# Patient Record
Sex: FEMALE | Race: BLACK OR AFRICAN AMERICAN | Employment: FULL TIME | URBAN - METROPOLITAN AREA
[De-identification: names, ages, dates, MRNs, and addresses within clinical notes are randomized per-mention and may not be internally consistent; named-entity substitution may affect disease eponyms.]

---

## 2019-09-13 LAB
CHLAMYDIA, EXTERNAL: NEGATIVE
HBSAG, EXTERNAL: NEGATIVE
HIV, EXTERNAL: NONREACTIVE
HIV, EXTERNAL: NORMAL
N. GONORRHEA, EXTERNAL: NEGATIVE
RUBELLA, EXTERNAL: NORMAL
T. PALLIDUM, EXTERNAL: NEGATIVE
T. PALLIDUM, EXTERNAL: NORMAL

## 2019-10-01 NOTE — H&P
Obstetrics Admission History & Physical    Name: Navi Arias MRN: [id number]  SSN: [id number]    YOB: 1985  Age: 28 y.o. Sex: female      Subjective:     Reason for Admission:  Pregnancy and cervical insufficiency    History of Present Illness: Navi Arias is a 28 y.o.  female at [id number] at 13w5d scheduled for Amezquita cerclage for history of 20wk PTL with twins, f/b mueller delivered at term following prophylactic cerclage and godfrey. Patient has had normal  testing thus far. Her history is notable for Rh neg status, anxiety but not on meds, PCOS on metformin, sickle cell trait but neg FOB testing and AMA but with normal NT. She is without complaint.     OB History        2    Para   1    Term   1       0    AB   1    Living   1       SAB   1    TAB   0    Ectopic   0    Molar        Multiple   1    Live Births   2              Past Medical History:   Diagnosis Date    Complication of anesthesia     lidocaine - rash    HX OTHER MEDICAL     polyps in ovarian tubes    HX OTHER MEDICAL     D&C post fetal demise 2014    Infertility, female     metformin, clomid    Leukopenia     hx of low WBC 2-3, idiopathic    Neuropathy     Phlebitis and thrombophlebitis of unspecified site     varicose veins bilateral legs    Polycystic disease, ovaries     Rhesus isoimmunization unspecified as to episode of care in pregnancy     Sickle-cell disease, unspecified     trait -  negative     Past Surgical History:   Procedure Laterality Date    HX DILATION AND CURETTAGE      HX OTHER SURGICAL  2014    d&c (retained placenta post fetal demise)     Social History     Socioeconomic History    Marital status:      Spouse name: Not on file    Number of children: Not on file    Years of education: Not on file    Highest education level: Not on file   Occupational History    Not on file   Social Needs    Financial resource strain: Not on file  Food insecurity:     Worry: Not on file     Inability: Not on file    Transportation needs:     Medical: Not on file     Non-medical: Not on file   Tobacco Use    Smoking status: Never Smoker    Smokeless tobacco: Never Used   Substance and Sexual Activity    Alcohol use: No    Drug use: No    Sexual activity: Yes     Partners: Male     Birth control/protection: None   Lifestyle    Physical activity:     Days per week: Not on file     Minutes per session: Not on file    Stress: Not on file   Relationships    Social connections:     Talks on phone: Not on file     Gets together: Not on file     Attends Faith service: Not on file     Active member of club or organization: Not on file     Attends meetings of clubs or organizations: Not on file     Relationship status: Not on file    Intimate partner violence:     Fear of current or ex partner: Not on file     Emotionally abused: Not on file     Physically abused: Not on file     Forced sexual activity: Not on file   Other Topics Concern    Not on file   Social History Narrative    Not on file     Family History   Problem Relation Age of Onset    Sickle Cell Anemia Mother     Hypertension Mother     Breast Cancer Paternal Grandmother     Cancer Paternal Grandmother     Stroke Maternal Grandfather      Allergies   Allergen Reactions    Betadine [Povidone-Iodine] Rash    Flagyl [Metronidazole] Swelling     Tongue swelling      Lidocaine Rash    Zantac [Ranitidine Hcl] Vertigo and Drowsiness     Prior to Admission medications    Medication Sig Start Date End Date Taking? Authorizing Provider   multivitamin (ONE A DAY) tablet Take 1 Tab by mouth daily. Provider, Historical   cholecalciferol (VITAMIN D3) 400 unit tab tablet Take 400 Units by mouth daily. Provider, Historical   ibuprofen (MOTRIN) 600 mg tablet Take 1 Tab by mouth every six (6) hours as needed for Pain.  4/11/15   Huang Mcdonald MD        Review of Systems:  A comprehensive review of systems was negative except for that written in the History of Present Illness. Objective:     Vitals: There were no vitals taken for this visit. No data recorded. No data recorded     Physical Exam:  Patient without distress. Heart: Regular rate and rhythm  Lung: normal respiratory effort  Abdomen: soft, nontender  Lower Extremities:  - Edema No       Labs: No results found for this or any previous visit (from the past 24 hour(s)). Assessment and Plan:     [id number] at 13w5d presents for scheduled prophylactic Amezquita cerclage.     - consent obtained and all questions answered  - to OR for Amezquita cerclage  - will need rhogam post-procedure    Signed By:  Lizette Coyne MD     October 1, 2019

## 2019-10-02 ENCOUNTER — ANESTHESIA EVENT (OUTPATIENT)
Dept: LABOR AND DELIVERY | Age: 34
End: 2019-10-02
Payer: COMMERCIAL

## 2019-10-02 ENCOUNTER — HOSPITAL ENCOUNTER (OUTPATIENT)
Age: 34
Discharge: HOME | End: 2019-10-02
Attending: OBSTETRICS & GYNECOLOGY | Admitting: OBSTETRICS & GYNECOLOGY
Payer: COMMERCIAL

## 2019-10-02 ENCOUNTER — ANESTHESIA (OUTPATIENT)
Dept: LABOR AND DELIVERY | Age: 34
End: 2019-10-02
Payer: COMMERCIAL

## 2019-10-02 VITALS
OXYGEN SATURATION: 99 % | WEIGHT: 118 LBS | SYSTOLIC BLOOD PRESSURE: 108 MMHG | RESPIRATION RATE: 18 BRPM | DIASTOLIC BLOOD PRESSURE: 66 MMHG | HEART RATE: 88 BPM | HEIGHT: 65 IN | BODY MASS INDEX: 19.66 KG/M2 | TEMPERATURE: 98.2 F

## 2019-10-02 DIAGNOSIS — G89.18 POST-OP PAIN: Primary | ICD-10-CM

## 2019-10-02 LAB
ERYTHROCYTE [DISTWIDTH] IN BLOOD BY AUTOMATED COUNT: 13.2 % (ref 11.5–14.5)
HCT VFR BLD AUTO: 34.5 % (ref 35–47)
HGB BLD-MCNC: 11.2 G/DL (ref 11.5–16)
MCH RBC QN AUTO: 27.8 PG (ref 26–34)
MCHC RBC AUTO-ENTMCNC: 32.5 G/DL (ref 30–36.5)
MCV RBC AUTO: 85.6 FL (ref 80–99)
NRBC # BLD: 0 K/UL (ref 0–0.01)
NRBC BLD-RTO: 0 PER 100 WBC
PLATELET # BLD AUTO: 284 K/UL (ref 150–400)
PMV BLD AUTO: 8.9 FL (ref 8.9–12.9)
RBC # BLD AUTO: 4.03 M/UL (ref 3.8–5.2)
WBC # BLD AUTO: 4.9 K/UL (ref 3.6–11)

## 2019-10-02 PROCEDURE — 74011250636 HC RX REV CODE- 250/636: Performed by: OBSTETRICS & GYNECOLOGY

## 2019-10-02 PROCEDURE — 75410000003 HC RECOV DEL/VAG/CSECN EA 0.5 HR: Performed by: OBSTETRICS & GYNECOLOGY

## 2019-10-02 PROCEDURE — 74011000258 HC RX REV CODE- 258: Performed by: NURSE ANESTHETIST, CERTIFIED REGISTERED

## 2019-10-02 PROCEDURE — 96360 HYDRATION IV INFUSION INIT: CPT

## 2019-10-02 PROCEDURE — 96367 TX/PROPH/DG ADDL SEQ IV INF: CPT

## 2019-10-02 PROCEDURE — 96372 THER/PROPH/DIAG INJ SC/IM: CPT

## 2019-10-02 PROCEDURE — 77030007866 HC KT SPN ANES BBMI -B: Performed by: ANESTHESIOLOGY

## 2019-10-02 PROCEDURE — 99283 EMERGENCY DEPT VISIT LOW MDM: CPT

## 2019-10-02 PROCEDURE — 76060000078 HC EPIDURAL ANESTHESIA: Performed by: OBSTETRICS & GYNECOLOGY

## 2019-10-02 PROCEDURE — 74011000250 HC RX REV CODE- 250: Performed by: OBSTETRICS & GYNECOLOGY

## 2019-10-02 PROCEDURE — 77030002986 HC SUT PROL J&J -A

## 2019-10-02 PROCEDURE — 76010000389 HC LDRP PROCEDURE 0.5 TO 1 HR: Performed by: OBSTETRICS & GYNECOLOGY

## 2019-10-02 PROCEDURE — 74011000250 HC RX REV CODE- 250: Performed by: NURSE ANESTHETIST, CERTIFIED REGISTERED

## 2019-10-02 PROCEDURE — 85027 COMPLETE CBC AUTOMATED: CPT

## 2019-10-02 PROCEDURE — 74011250636 HC RX REV CODE- 250/636: Performed by: NURSE ANESTHETIST, CERTIFIED REGISTERED

## 2019-10-02 PROCEDURE — 36415 COLL VENOUS BLD VENIPUNCTURE: CPT

## 2019-10-02 PROCEDURE — 77030018846 HC SOL IRR STRL H20 ICUM -A

## 2019-10-02 RX ORDER — BUPIVACAINE HYDROCHLORIDE 7.5 MG/ML
INJECTION, SOLUTION EPIDURAL; RETROBULBAR AS NEEDED
Status: DISCONTINUED | OUTPATIENT
Start: 2019-10-02 | End: 2019-10-02 | Stop reason: HOSPADM

## 2019-10-02 RX ORDER — OXYCODONE HYDROCHLORIDE 5 MG/1
5 TABLET ORAL
Qty: 12 TAB | Refills: 0 | Status: SHIPPED | OUTPATIENT
Start: 2019-10-02 | End: 2019-10-05

## 2019-10-02 RX ORDER — FENTANYL CITRATE 50 UG/ML
INJECTION, SOLUTION INTRAMUSCULAR; INTRAVENOUS AS NEEDED
Status: DISCONTINUED | OUTPATIENT
Start: 2019-10-02 | End: 2019-10-02 | Stop reason: HOSPADM

## 2019-10-02 RX ORDER — METFORMIN HYDROCHLORIDE 500 MG/1
500 TABLET ORAL 2 TIMES DAILY WITH MEALS
COMMUNITY

## 2019-10-02 RX ADMIN — HUMAN RHO(D) IMMUNE GLOBULIN 0.3 MG: 300 INJECTION, SOLUTION INTRAMUSCULAR at 12:38

## 2019-10-02 RX ADMIN — SODIUM CHLORIDE 1 G: 900 INJECTION INTRAVENOUS at 11:36

## 2019-10-02 RX ADMIN — SODIUM CHLORIDE 50000 UNITS: 900 IRRIGANT IRRIGATION at 11:30

## 2019-10-02 RX ADMIN — BUPIVACAINE HYDROCHLORIDE 7.5 MG: 7.5 INJECTION, SOLUTION EPIDURAL; RETROBULBAR at 11:18

## 2019-10-02 RX ADMIN — FENTANYL CITRATE 25 MCG: 50 INJECTION, SOLUTION INTRAMUSCULAR; INTRAVENOUS at 11:18

## 2019-10-02 NOTE — DISCHARGE INSTRUCTIONS
Patient Education        Cervical Cerclage to Prevent  Delivery: What to Expect at Home  Your Recovery  Cervical cerclage (say \"SER-vuh-kul ser-KLAZH\") is a procedure that helps keep your cervix from opening too soon. Your doctor has sewn your cervix shut to help prevent  labor. For the next few days, you may have:  · Cramping. · Spotting. · Pain when you urinate. Your doctor may give you instructions on when you can do your normal activities again, such as driving and going back to work. Your doctor will remove the stitches around your cervix at 37 weeks, or if you go into  labor or show signs of infection. This care sheet gives you a general idea about how long it will take for you to recover. But each person recovers at a different pace. Follow the steps below to get better as quickly as possible. How can you care for yourself at home? Activity    · Rest when you feel tired.     · Ask your doctor when it is okay for you to have sex. Medicines    · Be safe with medicines. Read and follow all instructions on the label.     · If you are not taking a prescription pain medicine, ask your doctor if you can take an over-the-counter medicine.     · Your doctor will tell you if and when you can restart your medicines. He or she will also give you instructions about taking any new medicines. Follow-up care is a key part of your treatment and safety. Be sure to make and go to all appointments, and call your doctor if you are having problems. It's also a good idea to know your test results and keep a list of the medicines you take. When should you call for help? Call 911 anytime you think you may need emergency care.  For example, call if:    · You have severe trouble breathing.     · You have sudden, severe pain in your belly.     · You have severe vaginal bleeding.    Call your doctor now or seek immediate medical care if:    · You have new pelvic pain, or the pain in your pelvis gets worse.     · You have a new discharge from your vagina.     · You have a new or higher fever.    Watch closely for changes in your health, and be sure to contact your doctor if you have any problems. Where can you learn more? Go to http://luz maria-corazon.info/. Enter M484 in the search box to learn more about \"Cervical Cerclage to Prevent  Delivery: What to Expect at Home. \"  Current as of: May 29, 2019  Content Version: 12.2  © 6276-1645 [employer]. Care instructions adapted under license by [employer] (which disclaims liability or warranty for this information). If you have questions about a medical condition or this instruction, always ask your healthcare professional. [address] any warranty or liability for your use of this information.

## 2019-10-02 NOTE — ANESTHESIA PROCEDURE NOTES
Spinal Block    Start time: 10/2/2019 11:10 AM  End time: 10/2/2019 11:14 AM  Performed by: Dana García MD  Authorized by: Dana García MD     Pre-procedure:   Indications: primary anesthetic    Timeout Time: 11:10          Spinal Block:   Patient Position:  Seated  Prep Region:  Lumbar  Prep: chlorhexidine      Location:  L3-4  Technique:  Single shot        Needle:   Needle Type:  Pencan  Needle Gauge:  24 G  Attempts:  1      Events: CSF confirmed, no blood with aspiration and no paresthesia        Assessment:  Insertion:  Uncomplicated  Patient tolerance:  Patient tolerated the procedure well with no immediate complications

## 2019-10-02 NOTE — DISCHARGE SUMMARY
Gynecology Discharge Summary     Patient ID:  Wero White  [id number]  28 y.o.  1985    Admit date: 10/2/2019    Discharge date: 10/2/2019     Admission Diagnoses:   Patient Active Problem List   Diagnosis Code    Active labor [id number]    Normal vaginal delivery O80       Discharge Diagnoses: There are no discharge diagnoses documented for the most recent discharge. Patient Active Problem List   Diagnosis Code    Active labor [id number]    Normal vaginal delivery O80       Procedures for this admission: Procedure(s):  [facility] Course:    Disposition: Home or self care    Discharged Condition: stable            Patient Instructions:   Current Discharge Medication List      START taking these medications    Details   oxyCODONE IR (ROXICODONE) 5 mg immediate release tablet Take 1 Tab by mouth every six (6) hours as needed for Pain for up to 3 days. Max Daily Amount: 20 mg.  Qty: 12 Tab, Refills: 0    Associated Diagnoses: Post-op pain         CONTINUE these medications which have NOT CHANGED    Details   metFORMIN (GLUCOPHAGE) 500 mg tablet Take 500 mg by mouth two (2) times daily (with meals). [id number] arlene-folic-dha (PRENATAL DHA+COMPLETE PRENATAL) -300 mg-mcg-mg cmpk Take  by mouth. cholecalciferol (VITAMIN D3) 400 unit tab tablet Take 400 Units by mouth daily. STOP taking these medications       multivitamin (ONE A DAY) tablet Comments:   Reason for Stopping:         ibuprofen (MOTRIN) 600 mg tablet Comments:   Reason for Stopping:             Activity: Activity as tolerated and no driving for today and No sex for 2 weeks  Diet: Regular Diet  Wound Care: Keep wound clean and dry and None needed    Follow-up with Dr. Ben Diamond and Yumiko Peter in 1 week.     Signed:  Sarah Pandey MD  10/2/2019  11:58 AM

## 2019-10-02 NOTE — OP NOTES
OPERATIVE REPORT    PREOPERATIVE DIAGNOSIS: Cervical insufficiency. POSTOPERATIVE DIAGNOSIS: Cervical insufficiency. PROCEDURE: prophylactic Amezquita cervical cerclage. SURGEON: Melani Pyle. Harriet Urbina MD    ASSISTANT: Melissa Cortes MD    ANESTHESIA: Spinal.    COMPLICATIONS: None. ESTIMATED BLOOD LOSS: 50 mL. URINE OUTPUT: void prior to procedure    SPECIMENS: None. INDICATIONS: history of cervical insufficiency    FINDINGS: soft cervix, 1cm dilated external os, internal os closed    DESCRIPTION OF PROCEDURE: The patient was taken back to the operating room. She was given anesthesia via placement of a spinal. She was laid supine on  the operating room table with her legs in lithotomy position. She was  prepped and draped in standard sterile fashion. An exam under anesthesia  revealed the findings above. An operative speculum was then inserted into  the vagina, dilute bacitracin was used to irrigate the vagina. The cervix was visualized and grasped anteriorly with a ring forceps. A #1 prolene suture was used to circumferentially create a  pursestring surrounding the cervix at the cervicovaginal junction. This  suture was then tied at the 12 o'clock position with air knots for  later identification. Hemostasis was excellent. The speculum was removed. An exam under anesthesia again revealed good cerclage placement and tightly closed cervix. The patient was taken to the recovery room in stable condition, having tolerated the procedure well. Melani Pyle.  Harriet Urbina MD

## 2019-10-02 NOTE — ANESTHESIA PREPROCEDURE EVALUATION
Relevant Problems   No relevant active problems       Anesthetic History   No history of anesthetic complications            Review of Systems / Medical History  Patient summary reviewed, nursing notes reviewed and pertinent labs reviewed    Pulmonary  Within defined limits                 Neuro/Psych   Within defined limits           Cardiovascular  Within defined limits                     GI/Hepatic/Renal  Within defined limits              Endo/Other  Within defined limits           Other Findings              Physical Exam    Airway  Mallampati: II  TM Distance: > 6 cm  Neck ROM: normal range of motion   Mouth opening: Normal     Cardiovascular  Regular rate and rhythm,  S1 and S2 normal,  no murmur, click, rub, or gallop             Dental  No notable dental hx       Pulmonary  Breath sounds clear to auscultation               Abdominal  GI exam deferred       Other Findings            Anesthetic Plan    ASA: 2  Anesthesia type: spinal          Induction: Intravenous  Anesthetic plan and risks discussed with: Patient

## 2019-10-02 NOTE — ANESTHESIA POSTPROCEDURE EVALUATION
Post-Anesthesia Evaluation and Assessment    Patient: Gianna Roberts MRN: [id number]  SSN: [id number]    YOB: 1985  Age: 28 y.o. Sex: female      I have evaluated the patient and they are stable and ready for discharge from the PACU. Cardiovascular Function/Vital Signs  Visit Vitals  /71   Pulse 75   Temp 36.6 °C (97.9 °F)   Resp 18   Ht 5' 5\" (1.651 m)   Wt 53.5 kg (118 lb)   SpO2 94%   BMI 19.64 kg/m²       Patient is status post Spinal anesthesia for Procedure(s) with comments:  CERCLAGE - EDC 4/3/20. Nausea/Vomiting: None    Postoperative hydration reviewed and adequate. Pain:  Pain Scale 1: Numeric (0 - 10) (10/02/19 0936)  Pain Intensity 1: 0 (10/02/19 0936)   Managed    Neurological Status: At baseline    Mental Status, Level of Consciousness: Alert and  oriented to person, place, and time    Pulmonary Status:   O2 Device: Room air (10/02/19 1203)   Adequate oxygenation and airway patent    Complications related to anesthesia: None    Post-anesthesia assessment completed. No concerns    Signed By: Anum Parisi MD     October 2, 2019              Procedure(s):  CERCLAGE.    spinal    <BSHSIANPOST>    Vitals Value Taken Time   /63 10/2/2019 12:26 PM   Temp 36.6 °C (97.9 °F) 10/2/2019 12:03 PM   Pulse 89 10/2/2019 12:27 PM   Resp 18 10/2/2019 12:03 PM   SpO2 87 % 10/2/2019 12:28 PM   Vitals shown include unvalidated device data.

## 2019-10-02 NOTE — BRIEF OP NOTE
BRIEF OPERATIVE NOTE    Date of Procedure: 10/2/2019   Preoperative Diagnosis: cervical insufficiency  Postoperative Diagnosis: same  Procedure(s):  Prophylactic Amezquita CERCLAGE  Surgeon(s) and Role:     * Cassi Morgan, Yessica Castillo MD - Primary     * Lo Reed MD - Assisting         Surgical Staff:  Circ-1: Galilea Shaw RN  Scrub Tech-1: Lesa Black  [facility] Staff: Jessica Weiner RN  No case tracking events are documented in the log.   Anesthesia: Spinal   Estimated Blood Loss: 50ml  Specimens: * No specimens in log *   Findings: soft but closed cervix   Complications: none  Implants: * No implants in log *

## 2019-10-02 NOTE — ROUTINE PROCESS
8049: patient arrived to [facility] 1277 for scheduled cerclage. Patient denies any leaking of fluid or bleeding; reports some fetal movement. 1110: to OR2 for cerclage. 1155: back to LD6 for recovery.

## 2019-10-03 LAB
BLD PROD TYP BPU: NORMAL
BPU ID: NORMAL
GA (WEEKS): NORMAL WK
STATUS OF UNIT,%ST: NORMAL
UNIT DIVISION, %UDIV: 0

## 2020-03-06 LAB — GRBS, EXTERNAL: NEGATIVE

## 2020-03-29 ENCOUNTER — HOSPITAL ENCOUNTER (INPATIENT)
Age: 35
LOS: 1 days | Discharge: HOME | DRG: 560 | End: 2020-03-30
Attending: OBSTETRICS & GYNECOLOGY | Admitting: ADVANCED PRACTICE MIDWIFE
Payer: MEDICAID

## 2020-03-29 PROBLEM — Z34.90 PREGNANCY: Status: ACTIVE | Noted: 2020-03-29

## 2020-03-29 LAB
BASOPHILS # BLD: 0 K/UL (ref 0–0.1)
BASOPHILS # BLD: 0 K/UL (ref 0–0.1)
BASOPHILS NFR BLD: 0 % (ref 0–1)
BASOPHILS NFR BLD: 0 % (ref 0–1)
DIFFERENTIAL METHOD BLD: ABNORMAL
DIFFERENTIAL METHOD BLD: ABNORMAL
EOSINOPHIL # BLD: 0 K/UL (ref 0–0.4)
EOSINOPHIL # BLD: 0 K/UL (ref 0–0.4)
EOSINOPHIL NFR BLD: 0 % (ref 0–7)
EOSINOPHIL NFR BLD: 0 % (ref 0–7)
ERYTHROCYTE [DISTWIDTH] IN BLOOD BY AUTOMATED COUNT: 12.9 % (ref 11.5–14.5)
ERYTHROCYTE [DISTWIDTH] IN BLOOD BY AUTOMATED COUNT: 13 % (ref 11.5–14.5)
HCT VFR BLD AUTO: 30.4 % (ref 35–47)
HCT VFR BLD AUTO: 35.3 % (ref 35–47)
HGB BLD-MCNC: 11.4 G/DL (ref 11.5–16)
HGB BLD-MCNC: 9.9 G/DL (ref 11.5–16)
IMM GRANULOCYTES # BLD AUTO: 0.1 K/UL (ref 0–0.04)
IMM GRANULOCYTES # BLD AUTO: 0.1 K/UL (ref 0–0.04)
IMM GRANULOCYTES NFR BLD AUTO: 0 % (ref 0–0.5)
IMM GRANULOCYTES NFR BLD AUTO: 1 % (ref 0–0.5)
LYMPHOCYTES # BLD: 0.9 K/UL (ref 0.8–3.5)
LYMPHOCYTES # BLD: 1.1 K/UL (ref 0.8–3.5)
LYMPHOCYTES NFR BLD: 16 % (ref 12–49)
LYMPHOCYTES NFR BLD: 8 % (ref 12–49)
MCH RBC QN AUTO: 28.5 PG (ref 26–34)
MCH RBC QN AUTO: 28.6 PG (ref 26–34)
MCHC RBC AUTO-ENTMCNC: 32.3 G/DL (ref 30–36.5)
MCHC RBC AUTO-ENTMCNC: 32.6 G/DL (ref 30–36.5)
MCV RBC AUTO: 87.9 FL (ref 80–99)
MCV RBC AUTO: 88.3 FL (ref 80–99)
MONOCYTES # BLD: 0.5 K/UL (ref 0–1)
MONOCYTES # BLD: 0.8 K/UL (ref 0–1)
MONOCYTES NFR BLD: 6 % (ref 5–13)
MONOCYTES NFR BLD: 7 % (ref 5–13)
NEUTS SEG # BLD: 5.5 K/UL (ref 1.8–8)
NEUTS SEG # BLD: 9.6 K/UL (ref 1.8–8)
NEUTS SEG NFR BLD: 77 % (ref 32–75)
NEUTS SEG NFR BLD: 85 % (ref 32–75)
NRBC # BLD: 0 K/UL (ref 0–0.01)
NRBC # BLD: 0 K/UL (ref 0–0.01)
NRBC BLD-RTO: 0 PER 100 WBC
NRBC BLD-RTO: 0 PER 100 WBC
PLATELET # BLD AUTO: 206 K/UL (ref 150–400)
PLATELET # BLD AUTO: 228 K/UL (ref 150–400)
PMV BLD AUTO: 9.6 FL (ref 8.9–12.9)
PMV BLD AUTO: 9.7 FL (ref 8.9–12.9)
RBC # BLD AUTO: 3.46 M/UL (ref 3.8–5.2)
RBC # BLD AUTO: 4 M/UL (ref 3.8–5.2)
WBC # BLD AUTO: 11.4 K/UL (ref 3.6–11)
WBC # BLD AUTO: 7.1 K/UL (ref 3.6–11)

## 2020-03-29 PROCEDURE — 86900 BLOOD TYPING SEROLOGIC ABO: CPT

## 2020-03-29 PROCEDURE — 85025 COMPLETE CBC W/AUTO DIFF WBC: CPT

## 2020-03-29 PROCEDURE — 74011250637 HC RX REV CODE- 250/637

## 2020-03-29 PROCEDURE — 65410000002 HC RM PRIVATE OB

## 2020-03-29 PROCEDURE — 85461 HEMOGLOBIN FETAL: CPT

## 2020-03-29 PROCEDURE — 75410000003 HC RECOV DEL/VAG/CSECN EA 0.5 HR

## 2020-03-29 PROCEDURE — 99282 EMERGENCY DEPT VISIT SF MDM: CPT

## 2020-03-29 PROCEDURE — 77030012890

## 2020-03-29 PROCEDURE — 75410000000 HC DELIVERY VAGINAL/SINGLE

## 2020-03-29 PROCEDURE — 0KQM0ZZ REPAIR PERINEUM MUSCLE, OPEN APPROACH: ICD-10-PCS | Performed by: OBSTETRICS & GYNECOLOGY

## 2020-03-29 PROCEDURE — 75410000002 HC LABOR FEE PER 1 HR

## 2020-03-29 PROCEDURE — 74011250636 HC RX REV CODE- 250/636

## 2020-03-29 PROCEDURE — 36415 COLL VENOUS BLD VENIPUNCTURE: CPT

## 2020-03-29 RX ORDER — SIMETHICONE 80 MG
80 TABLET,CHEWABLE ORAL
Status: DISCONTINUED | OUTPATIENT
Start: 2020-03-29 | End: 2020-03-30 | Stop reason: HOSPADM

## 2020-03-29 RX ORDER — NALOXONE HYDROCHLORIDE 0.4 MG/ML
0.4 INJECTION, SOLUTION INTRAMUSCULAR; INTRAVENOUS; SUBCUTANEOUS AS NEEDED
Status: DISCONTINUED | OUTPATIENT
Start: 2020-03-29 | End: 2020-03-30 | Stop reason: HOSPADM

## 2020-03-29 RX ORDER — SODIUM CHLORIDE 0.9 % (FLUSH) 0.9 %
5-40 SYRINGE (ML) INJECTION EVERY 8 HOURS
Status: DISCONTINUED | OUTPATIENT
Start: 2020-03-29 | End: 2020-03-29

## 2020-03-29 RX ORDER — HYDROCORTISONE ACETATE PRAMOXINE HCL 2.5; 1 G/100G; G/100G
CREAM TOPICAL AS NEEDED
Status: DISCONTINUED | OUTPATIENT
Start: 2020-03-29 | End: 2020-03-30 | Stop reason: HOSPADM

## 2020-03-29 RX ORDER — IBUPROFEN 600 MG/1
600 TABLET ORAL
Qty: 30 TAB | Refills: 1 | Status: SHIPPED | OUTPATIENT
Start: 2020-03-29

## 2020-03-29 RX ORDER — HYDROCODONE BITARTRATE AND ACETAMINOPHEN 5; 325 MG/1; MG/1
1 TABLET ORAL
Status: DISCONTINUED | OUTPATIENT
Start: 2020-03-29 | End: 2020-03-30 | Stop reason: HOSPADM

## 2020-03-29 RX ORDER — ACETAMINOPHEN 325 MG/1
650 TABLET ORAL
Status: DISCONTINUED | OUTPATIENT
Start: 2020-03-29 | End: 2020-03-30 | Stop reason: HOSPADM

## 2020-03-29 RX ORDER — MAG HYDROX/ALUMINUM HYD/SIMETH 200-200-20
30 SUSPENSION, ORAL (FINAL DOSE FORM) ORAL
Status: DISCONTINUED | OUTPATIENT
Start: 2020-03-29 | End: 2020-03-29

## 2020-03-29 RX ORDER — SODIUM CHLORIDE 0.9 % (FLUSH) 0.9 %
5-40 SYRINGE (ML) INJECTION AS NEEDED
Status: DISCONTINUED | OUTPATIENT
Start: 2020-03-29 | End: 2020-03-29

## 2020-03-29 RX ORDER — NALOXONE HYDROCHLORIDE 0.4 MG/ML
0.4 INJECTION, SOLUTION INTRAMUSCULAR; INTRAVENOUS; SUBCUTANEOUS AS NEEDED
Status: DISCONTINUED | OUTPATIENT
Start: 2020-03-29 | End: 2020-03-29

## 2020-03-29 RX ORDER — MISOPROSTOL 200 UG/1
1000 TABLET ORAL
Status: COMPLETED | OUTPATIENT
Start: 2020-03-29 | End: 2020-03-29

## 2020-03-29 RX ORDER — MISOPROSTOL 200 UG/1
TABLET ORAL
Status: COMPLETED
Start: 2020-03-29 | End: 2020-03-29

## 2020-03-29 RX ORDER — CYANOCOBALAMIN (VITAMIN B-12) 500 MCG
400 TABLET ORAL DAILY
Status: DISCONTINUED | OUTPATIENT
Start: 2020-03-29 | End: 2020-03-30 | Stop reason: HOSPADM

## 2020-03-29 RX ORDER — OXYTOCIN 10 [USP'U]/ML
INJECTION, SOLUTION INTRAMUSCULAR; INTRAVENOUS
Status: COMPLETED
Start: 2020-03-29 | End: 2020-03-29

## 2020-03-29 RX ORDER — OXYTOCIN 10 [USP'U]/ML
10 INJECTION, SOLUTION INTRAMUSCULAR; INTRAVENOUS
Status: COMPLETED | OUTPATIENT
Start: 2020-03-29 | End: 2020-03-29

## 2020-03-29 RX ORDER — IBUPROFEN 400 MG/1
800 TABLET ORAL EVERY 8 HOURS
Status: DISCONTINUED | OUTPATIENT
Start: 2020-03-29 | End: 2020-03-30 | Stop reason: HOSPADM

## 2020-03-29 RX ORDER — SWAB
1 SWAB, NON-MEDICATED MISCELLANEOUS DAILY
Status: DISCONTINUED | OUTPATIENT
Start: 2020-03-29 | End: 2020-03-30 | Stop reason: HOSPADM

## 2020-03-29 RX ADMIN — OXYTOCIN 10 UNITS: 10 INJECTION, SOLUTION INTRAMUSCULAR; INTRAVENOUS at 02:58

## 2020-03-29 RX ADMIN — MISOPROSTOL 1000 MCG: 200 TABLET ORAL at 03:04

## 2020-03-29 RX ADMIN — OXYTOCIN 10 UNITS: 10 INJECTION INTRAVENOUS at 02:58

## 2020-03-29 NOTE — PROGRESS NOTES
Post-Partum Day Number 0 Progress Note    Acacia Arriaga     Assessment: Doing well, post partum day 1  PPH QBL 1140 --> s/p pit, cytotec. Hb 11.4 --> 9.9    Plan:  1. Continue routine postpartum and perineal care as well as maternal education. 2. Hb stable, pt asymptomatic. Reviewed plan for DC home on . 3. Anticipate discharge home in AM if stable. Information for the patient's :  Sergio Addison [[id number]]   Vaginal, Spontaneous   Patient doing well without significant complaint. Voiding without difficulty, normal lochia. Vitals:  Visit Vitals  BP 96/64   Pulse 96   Temp 98.8 °F (37.1 °C)   Resp 16   Ht 5' 5\" (1.651 m)   Wt 66.2 kg (146 lb)   Breastfeeding Unknown   BMI 24.30 kg/m²     Temp (24hrs), Av.6 °F (37 °C), Min:98 °F (36.7 °C), Max:99.2 °F (37.3 °C)        Exam:   Patient without distress. Abdomen soft, fundus firm, nontender                Perineum with normal lochia noted. Lower extremities are negative for swelling, cords or tenderness.     Labs:     Lab Results   Component Value Date/Time    WBC 11.4 (H) 2020 07:22 AM    WBC 7.1 2020 01:56 AM    WBC 4.9 10/02/2019 10:01 AM    WBC 2.2 (L) 2016 09:13 AM    WBC 3.8 10/16/2016 04:55 AM    WBC 3.0 (L) 2016 08:46 PM    WBC 6.8 2015 06:33 AM    WBC 5.3 10/09/2014 08:58 AM    HGB 9.9 (L) 2020 07:22 AM    HGB 11.4 (L) 2020 01:56 AM    HGB 11.2 (L) 10/02/2019 10:01 AM    HGB 14.0 2016 09:13 AM    HGB 13.2 10/16/2016 04:55 AM    HGB 13.4 2016 08:46 PM    HGB 12.2 2015 06:33 AM    HGB 11.1 (L) 10/09/2014 08:58 AM    HCT 30.4 (L) 2020 07:22 AM    HCT 35.3 2020 01:56 AM    HCT 34.5 (L) 10/02/2019 10:01 AM    HCT 41.5 2016 09:13 AM    HCT 38.8 10/16/2016 04:55 AM    HCT 39.9 2016 08:46 PM    HCT 37.1 2015 06:33 AM    HCT 32.9 (L) 10/09/2014 08:58 AM    PLATELET 610  07:22 AM    PLATELET 901  01:56 AM    PLATELET 419 86/47/7110 10:01 AM    PLATELET 933 85/57/1805 09:13 AM    PLATELET 575 84/66/3858 04:55 AM    PLATELET 99 (L) 66/86/2185 08:46 PM    PLATELET 629 62/20/1184 06:33 AM    PLATELET 666 07/36/9701 08:58 AM       Recent Results (from the past 24 hour(s))   CBC WITH AUTOMATED DIFF    Collection Time: 03/29/20  1:56 AM   Result Value Ref Range    WBC 7.1 3.6 - 11.0 K/uL    RBC 4.00 3.80 - 5.20 M/uL    HGB 11.4 (L) 11.5 - 16.0 g/dL    HCT 35.3 35.0 - 47.0 %    MCV 88.3 80.0 - 99.0 FL    MCH 28.5 26.0 - 34.0 PG    MCHC 32.3 30.0 - 36.5 g/dL    RDW 13.0 11.5 - 14.5 %    PLATELET 178 219 - 262 K/uL    MPV 9.6 8.9 - 12.9 FL    NRBC 0.0 0  WBC    ABSOLUTE NRBC 0.00 0.00 - 0.01 K/uL    NEUTROPHILS 77 (H) 32 - 75 %    LYMPHOCYTES 16 12 - 49 %    MONOCYTES 6 5 - 13 %    EOSINOPHILS 0 0 - 7 %    BASOPHILS 0 0 - 1 %    IMMATURE GRANULOCYTES 1 (H) 0.0 - 0.5 %    ABS. NEUTROPHILS 5.5 1.8 - 8.0 K/UL    ABS. LYMPHOCYTES 1.1 0.8 - 3.5 K/UL    ABS. MONOCYTES 0.5 0.0 - 1.0 K/UL    ABS. EOSINOPHILS 0.0 0.0 - 0.4 K/UL    ABS. BASOPHILS 0.0 0.0 - 0.1 K/UL    ABS. IMM. GRANS. 0.1 (H) 0.00 - 0.04 K/UL    DF AUTOMATED     CBC WITH AUTOMATED DIFF    Collection Time: 03/29/20  7:22 AM   Result Value Ref Range    WBC 11.4 (H) 3.6 - 11.0 K/uL    RBC 3.46 (L) 3.80 - 5.20 M/uL    HGB 9.9 (L) 11.5 - 16.0 g/dL    HCT 30.4 (L) 35.0 - 47.0 %    MCV 87.9 80.0 - 99.0 FL    MCH 28.6 26.0 - 34.0 PG    MCHC 32.6 30.0 - 36.5 g/dL    RDW 12.9 11.5 - 14.5 %    PLATELET 527 669 - 321 K/uL    MPV 9.7 8.9 - 12.9 FL    NRBC 0.0 0  WBC    ABSOLUTE NRBC 0.00 0.00 - 0.01 K/uL    NEUTROPHILS 85 (H) 32 - 75 %    LYMPHOCYTES 8 (L) 12 - 49 %    MONOCYTES 7 5 - 13 %    EOSINOPHILS 0 0 - 7 %    BASOPHILS 0 0 - 1 %    IMMATURE GRANULOCYTES 0 0.0 - 0.5 %    ABS. NEUTROPHILS 9.6 (H) 1.8 - 8.0 K/UL    ABS. LYMPHOCYTES 0.9 0.8 - 3.5 K/UL    ABS. MONOCYTES 0.8 0.0 - 1.0 K/UL    ABS. EOSINOPHILS 0.0 0.0 - 0.4 K/UL    ABS.  BASOPHILS 0.0 0.0 - 0.1 K/UL ABS. IMM.  GRANS. 0.1 (H) 0.00 - 0.04 K/UL    DF AUTOMATED

## 2020-03-29 NOTE — PROGRESS NOTES
TRANSFER - IN REPORT:    Verbal report received from LAUREN(name) on Sidra Whalen  being received from L&D(unit) for routine progression of care      Report consisted of patients Situation, Background, Assessment and   Recommendations(SBAR). Information from the following report(s) SBAR, Kardex, Intake/Output, MAR and Recent Results was reviewed with the receiving nurse. Opportunity for questions and clarification was provided. Assessment completed upon patients arrival to unit and care assumed.

## 2020-03-29 NOTE — DISCHARGE SUMMARY
Obstetrical Discharge Summary     Name: Flor Barron MRN: [id number]  SSN: [id number]    YOB: 1985  Age: 27 y.o. Sex: female      Admit Date: 3/29/2020    Discharge Date: 3/30/20    Admitting Physician: Rowena Andrew CNM     Attending Physician:  Yessica Cardenas,*     Admission Diagnoses: Pregnancy [Z34.90]  Pregnancy [Z34.90]    Discharge Diagnoses:   Information for the patient's :  Nanci Griffiths [[id number]]   Delivery of a 3.425 kg female infant via Vaginal, Spontaneous on 3/29/2020 at 2:45 AM  by Rowena Andrew. Apgars were 9  and 9 . Additional Diagnoses:   Hospital Problems  Date Reviewed: 2016          Codes Class Noted POA    Pregnancy ICD-10-CM: Z34.90  ICD-9-CM: V22.2  3/29/2020 Unknown             Lab Results   Component Value Date/Time    Rubella, External immune 2019    GrBStrep, External negative 2020       Hospital Course: Normal hospital course following the delivery. Patient Instructions:   Current Discharge Medication List      START taking these medications    Details   ibuprofen (MOTRIN) 600 mg tablet Take 1 Tab by mouth every six (6) hours as needed for Pain. Qty: 30 Tab, Refills: 1         CONTINUE these medications which have NOT CHANGED    Details   metFORMIN (GLUCOPHAGE) 500 mg tablet Take 500 mg by mouth two (2) times daily (with meals). [id number] arlene-folic-dha (PRENATAL DHA+COMPLETE PRENATAL) -300 mg-mcg-mg cmpk Take  by mouth. cholecalciferol (VITAMIN D3) 400 unit tab tablet Take 400 Units by mouth daily. Disposition at Discharge: Home or self care    Condition at Discharge: Stable    Reference my discharge instructions.     Follow-up Appointments   Procedures    FOLLOW UP VISIT Appointment in: 6 Weeks     Standing Status:   Standing     Number of Occurrences:   1     Order Specific Question:   Appointment in     Answer:   6 Weeks        Signed By:  [place] Sorenson MD      2020

## 2020-03-29 NOTE — LACTATION NOTE
This note was copied from a baby's chart. Initial Lactation Consultation:  Mom is 28yo  delivered today at 80  gestation 44 2/7 weeks. Mom has history of : PCOS, Infertility requiring Clomid and Metformin, SCT. Lactation history: breast fed her 9yo for 15 months with one episode of clogged duct and mastitis. Discussed with parents: positioning and alternating positions, using pillows to support nursing couplet, characteristics deep v shallow latch, and feeding cues. Mom declined demonstration breast massage and hand expression- states she can easily express EBM. Baby nursing well after delivery, deep latch obtained, mother is comfortable, baby feeding vigorously with rhythmic suck, swallow, breathe pattern, both breasts offered, baby is skin to skin for feeding. Breasts may become engorged when milk \"comes in\". How milk is made / normal phases of milk production, supply and demand discussed. Taught care of engorged breasts - frequent breastfeeding encouraged, warm compresses and breast massage ac. Then nurse the baby or pump. Apply cold compresses pc x 15 minutes a few times a day for swelling or discomfort. May need to do this care for a couple of days. Discussed prevention and treatment of mastitis. All lactation teaching completed and parents verbalizing confidence with infant feeding. They deny questions or need for assistance at this time and will call if needed.

## 2020-03-29 NOTE — L&D DELIVERY NOTE
CNM Delivery Note       Patient: Misti Barrera MRN: [id number]  SSN: [id number]    YOB: 1985  Age: 27 y.o. Sex: female         of a live female infant at 80 with Apgars 9, 9 in ZHANNA position under no anesthesia with mother in reclined position. Shoulders spontaneous (with pt in Guy), easily delivered with maternal effort. Vigorous infant placed on maternal abdomen immediately following delivery. Once cord stopped pulsating it was double clamped by CNM and cut by FOB. Cord blood collected and sent to lab. Placenta and membranes spontaneous, intact, with 3 vessel cord via Marylee Pinks mechanism at [phone]. Fundus boggy, clot noted. 10 units Pit given IM. Bleeding continued 1000mcg cytotec given rectally and clot manually expressed. Fundus firm and bleeding then WNL. QBL 1140 mL (Dr. Gina Hagan notified and aware). Vagina and perineum inspected. 2nd degree perineal laceration repaired with 2-0 vicryl rapide under no anesthesia per pt request. Mother and infant stable, bonding, establishing breastfeeding. Will repeat CBC at 0730. Delivery Summary    Patient: Misti Barrera MRN: [id number]  SSN: [id number]    YOB: 1985  Age: 27 y.o.   Sex: female       Information for the patient's :  Monica Morataya [[id number]]       Labor Events:    Labor: No    Steroids:     Cervical Ripening Date/Time:       Cervical Ripening Type: None   Antibiotics During Labor: No   Rupture Identifier:      Rupture Date/Time: 3/29/2020 2:15 AM   Rupture Type: SROM   Amniotic Fluid Volume: Large    Amniotic Fluid Description:      Amniotic Fluid Odor:      Induction: None       Induction Date/Time:        Indications for Induction:      Augmentation: None   Augmentation Date/Time:      Indications for Augmentation:     Labor complications: None       Additional complications:        Delivery Events:  Indications For Episiotomy:     Episiotomy: None   Perineal Laceration(s): 2nd Repaired: Yes   Periurethral Laceration Location:      Repaired:     Labial Laceration Location:     Repaired:     Sulcal Laceration Location:     Repaired:     Vaginal Laceration Location:     Repaired:     Cervical Laceration Location:     Repaired:     Repair Suture: Vicryl 2-0   Number of Repair Packets: 1   Estimated Blood Loss (ml):  ml   Quantitative Blood Loss (ml)                Delivery Date: 3/29/2020    Delivery Time: 2:45 AM  Delivery Type: Vaginal, Spontaneous  Sex:  Female    Gestational Age: 39w2d   Delivery Clinician:  Lin Bhat  Living Status: Living   Delivery Location: L&D            APGARS  One minute Five minutes Ten minutes   Skin color: 1   1        Heart rate: 2   2        Grimace: 2   2        Muscle tone: 2   2        Breathin   2        Totals: 9   9            Presentation: Vertex    Position: Left Occiput Anterior  Resuscitation Method:  Suctioning-bulb; Tactile Stimulation     Meconium Stained: Terminal      Cord Information: 3 Vessels  Complications: None  Cord around:    Delayed cord clamping? Yes  Cord clamped date/time:   Disposition of Cord Blood: Lab    Blood Gases Sent?: No    Placenta:  Date/Time: 3/29/2020  2:49 AM  Removal: Spontaneous      Appearance: Normal      Measurements:  Birth Weight:        Birth Length:        Head Circumference:        Chest Circumference:       Abdominal Girth: Other Providers:   Jovana OLSEN;BUDDY WATSON;ARIN RODRIGUEZ, Primary Nurse;Primary  Nurse;Midwife           Group B Strep:   Lab Results   Component Value Date/Time    GrBStrep, External negative 2015     Information for the patient's :  Zuri Mcdonald [[id number]]   No results found for: ABORH, PCTABR, PCTDIG, BILI, ABORHEXT, ABORH    No results for input(s): PCO2CB, PO2CB, HCO3I, SO2I, IBD, PTEMPI, SPECTI, PHICB, ISITE, IDEV, IALLEN in the last 72 hours.

## 2020-03-29 NOTE — DISCHARGE INSTRUCTIONS
POSTPARTUM DISCHARGE INSTRUCTIONS       Name:  Chano Rodríguez  YOB: 1985  Admission Diagnosis:  Pregnancy [Z34.90]  Pregnancy [Z34.90]     Discharge Diagnosis:    Problem List as of 3/29/2020 Date Reviewed: 9/28/2016          Codes Class Noted - Resolved    Pregnancy ICD-10-CM: Z34.90  ICD-9-CM: V22.2  3/29/2020 - Present        Normal vaginal delivery ICD-10-CM: O80  ICD-9-CM: 650  4/10/2015 - Present        Active labor ICD-10-CM: [id number]  ICD-9-CM: Sarah Dada  4/9/2015 - Present            Attending Physician:  Simeon Moss,*    Delivery Type:  Vaginal Childbirth: What To Expect At Home    Your Recovery: Your body will slowly heal in the next few weeks. It is easy to get too tired and overwhelmed during the first weeks after your baby is born. Changes in your hormones can shift your mood without warning. You may find it hard to meet the extra demands on your energy and time. Take it easy on yourself. Follow-up care is a key part of your treatment and safety. Be sure to make and go to all appointments, and call your doctor if you are having problems. It's also a good idea to know your test results and keep a list of the medicines you take. How can you care for yourself at home? Vaginal bleeding and cramps  · After delivery, you will have a bloody discharge from the vagina. This will turn pink within a week and then white or yellow after about 10 days. It may last for 2 to 4 weeks or longer, until the uterus has healed. Use pads instead of tampons until you stop bleeding. · Do not worry if you pass some blood clots, as long as they are smaller than a golf ball. If you have a tear or stitches in your vaginal area, change the pad at least every 4 hours to prevent soreness and infection. · You may have cramps for the first few days after childbirth. These are normal and occur as the uterus shrinks to normal size.  Take an over-the-counter pain medicine, such as acetaminophen (Tylenol), ibuprofen (Advil, Motrin), or naproxen (Aleve), for cramps. Read and follow all instructions on the label. Do not take aspirin, because it can cause more bleeding. Do not take acetaminophen (Tylenol) and other acetaminophen containing medications (i.e. Percocet) at the same time. Breast fullness  · Your breasts may overfill (engorge) in the first few days after delivery. To help milk flow and to relieve pain, warm your breasts in the shower or by using warm, moist towels before nursing. · If you are not nursing, do not put warmth on your breasts or touch your breasts. Wear a tight bra or sports bra and use ice until the fullness goes away. This usually takes 2 to 3 days. · Put ice or a cold pack on your breast after nursing to reduce swelling and pain. Put a thin cloth between the ice and your skin. Activity  · Eat a balanced diet. Do not try to lose weight by cutting calories. Keep taking your prenatal vitamins, or take a multivitamin. · Get as much rest as you can. Try to take naps when your baby sleeps during the day. · Get some exercise every day. But do not do any heavy exercise until your doctor says it is okay. · Wait until you are healed (about 4 to 6 weeks) before you have sexual intercourse. Your doctor will tell you when it is okay to have sex. · Talk to your doctor about birth control. You can get pregnant even before your period returns. Also, you can get pregnant while you are breast-feeding. Mental Health  · Many women get the \"baby blues\" during the first few days after childbirth. You may lose sleep, feel irritable, and cry easily. You may feel happy one minute and sad the next. Hormone changes are one cause of these emotional changes. Also, the demands of a new baby, along with visits from relatives or other family needs, add to a mother's stress. The \"baby blues\" often peak around the fourth day. Then they ease up in less than 2 weeks.   · If your moodiness or anxiety lasts for more than 2 weeks, or if you feel like life is not worth living, you may have postpartum depression. This is different for each mother. Some mothers with serious depression may worry intensely about their infant's well-being. Others may feel distant from their child. Some mothers might even feel that they might harm their baby. A mother may have signs of paranoia, wondering if someone is watching her. · With all the changes in your life, you may not know if you are depressed. Pregnancy sometimes causes changes in how you feel that are similar to the symptoms of depression. · Symptoms of depression include:  · Feeling sad or hopeless and losing interest in daily activities. These are the most common symptoms of depression. · Sleeping too much or not enough. · Feeling tired. You may feel as if you have no energy. · Eating too much or too little. · [employer] (PSI) offers a Warm line; Chat with the Expert phone sessions; Information and Articles about Pregnancy and Postpartum Mood Disorders; Comprehensive List of Free Support Groups; Knowledgeable local coordinators who will offer support, information, and resources; Guide to Resources on [employer]; Calendar of events in the  mood disorders community; Latest News and Research; and [facility] [address] for [employer]. Remember - You are not alone; You are not to blame; With help, you will be well. 2-164-866-PPD(0007). WWW. POSTPARTUM. NET   · Writing or talking about death, such as writing suicide notes or talking about guns, knives, or pills. Keep the numbers for these national suicide hotlines: 2-229-198-TALK (4-162.806.8222) and 8-436-LELPMVY (2-715.255.4556). If you or someone you know talks about suicide or feeling hopeless, get help right away. Constipation and Hemorrhoids  · Drink plenty of fluids, enough so that your urine is light yellow or clear like water.  If you have kidney, heart, or liver disease and have to limit fluids, talk with your doctor before you increase the amount of fluids you drink. · Eat plenty of fiber each day. Have a bran muffin or bran cereal for breakfast, and try eating a piece of fruit for a mid-afternoon snack. · For painful, itchy hemorrhoids, put ice or a cold pack on the area several times a day for 10 minutes at a time. Follow this by putting a warm compress on the area for another 10 to 20 minutes or by sitting in a shallow, warm bath. When should you call for help? Call 911 anytime you think you may need emergency care. For example, call if:  · You are thinking of hurting yourself, your baby, or anyone else. · You passed out (lost consciousness). · You have symptoms of a blood clot in your lung (called a pulmonary embolism). These may include:    · Sudden chest pain. · Trouble breathing. · Coughing up blood. Call your doctor now or seek immediate medical care if:  · You have severe vaginal bleeding. · You are soaking through a pad each hour for 2 or more hours. · Your vaginal bleeding seems to be getting heavier or is still bright red 4 days after delivery. · You are dizzy or lightheaded, or you feel like you may faint. · You are vomiting or cannot keep fluids down. · You have a fever. · You have new or more belly pain. · You pass tissue (not just blood). · Your vaginal discharge smells bad. · Your belly feels tender or full and hard. · Your breasts are continuously painful or red. · You feel sad, anxious, or hopeless for more than a few days. · You have sudden, severe pain in your belly. · You have symptoms of a blood clot in your leg (called a deep vein thrombosis),          such as:  · Pain in your calf, back of the knee, thigh, or groin. · Redness and swelling in your leg or groin. · You have symptoms of preeclampsia, such as:  · Sudden swelling of your face, hands, or feet. · New vision problems (such as dimness or blurring).   · A severe headache. · Your blood pressure is higher than it should be or rises suddenly. · You have new nausea or vomiting. Watch closely for changes in your health, and be sure to contact your doctor if you have any problems. Additional Information:  Postpartum Support    PARENTS:  Are you feeling sad or depressed? Is it difficult for you to enjoy yourself? Do you feel more irritable or tense? Do you feel anxious or panicky? Are you having difficulty bonding with your baby? Do you feel as if you are \"out of control\" or \"going crazy\"? Are you worried that you might hurt your baby or yourself? FAMILIES: Do you worry that something is wrong but don't know how to help? Do you think that your partner or spouse is having problems coping? Are you worried that it may never get better? While many women experience some mild mood change or \"the blues\" during or after the birth of a child, 1 in 9 women experience more significant symptoms of depression or anxiety. 1 in 10 Dads become depressed during the first year. Things you can do  Being a good parent includes taking care of yourself. If you take care of yourself, you will be able to take better care of your baby and your family. · Talk to a counselor or healthcare provider who has training in  mood and anxiety problems. · Learn as much as you can about pregnancy and postpartum depression and anxiety. · Get support from family and friends. Ask for help when you need it. · Join a support group in your area or online. · Keep active by walking, stretching or whatever form of exercise helps you to feel better. · Get enough rest and time for yourself. · Eat a healthy diet. · Don't give up! It may take more than one try to get the right help you need.        These are general instructions for a healthy lifestyle:    No smoking/ No tobacco products/ Avoid exposure to second hand smoke    Surgeon General's Warning:  Quitting smoking now greatly reduces serious risk to your health. Obesity, smoking, and sedentary lifestyle greatly increases your risk for illness    A healthy diet, regular physical exercise & weight monitoring are important for maintaining a healthy lifestyle    Recognize signs and symptoms of STROKE:    F-face looks uneven    A-arms unable to move or move unevenly    S-speech slurred or non-existent    T-time-call 911 as soon as signs and symptoms begin - DO NOT go       back to bed or wait to see if you get better - TIME IS BRAIN. I have had the opportunity to make my options or choices for discharge. I have received and understand these instructions.

## 2020-03-29 NOTE — PROGRESS NOTES
3/29/2020 12:36 AM Patient arrived from home for contractions since . Denies leaking of fluid or vaginal bleeding. Reports +FM. Patient is on facetime with her . [address] at bedside. SVE 5/80/-2. Will admit to labor and delivery. 0215 Patient called out for ROM. Grossly ruptured. [address] [employer] at bedside. Forebag ruptured. 0244 Start pushing. 80  of live female infant. 0500 Patient up bedside commode. Voided large amount. 8727 TRANSFER - OUT REPORT:    Verbal report given to Maribel Toro RN(name) on Parker Trinh  being transferred to MIU (unit) for routine progression of care       Report consisted of patients Situation, Background, Assessment and   Recommendations(SBAR). Information from the following report(s) SBAR, Kardex, Intake/Output, MAR and Recent Results was reviewed with the receiving nurse. Lines:       Opportunity for questions and clarification was provided.       Patient transported with:   Registered Nurse

## 2020-03-29 NOTE — H&P
[facility] History and Physical    Patient: Rishabh Lomeli MRN: [id number]  SSN: [id number]    YOB: 1985  Age: 27 y.o. Sex: female      Subjective:      Rishabh Lomeli is a 27 y.o. female  at 44w2d with an KAYE of 4/3/20. She presents to labor and delivery for contractions that started around 2200 last night. They have gotten closer together and more intense since that time. Reports some bloody show earlier in the night, it has since stopped. Denies LOF, endorses good fetal movement. Pregnancy complicated by history of PTL and PTD of twins at 20 weeks. Pt then had one term vaginal delivery. Received ppx cerclage with this pregnancy and completed prometrium. PT is AMA. Pt with history of PCOS, took metformin, stopped in second trimester. Pt with sickle cell trait, FOB negative. Pt anemic- taking supplementation. Pt with hx of anxiety. Fetus with known enlarged bowel.     Past Medical History:   Diagnosis Date    Complication of anesthesia     lidocaine - rash    HX OTHER MEDICAL     polyps in ovarian tubes    HX OTHER MEDICAL     D&C post fetal demise 2014    Infertility, female     metformin, clomid    Leukopenia     hx of low WBC 2-3, idiopathic    Neuropathy     Phlebitis and thrombophlebitis of unspecified site     varicose veins bilateral legs    Polycystic disease, ovaries     Rhesus isoimmunization unspecified as to episode of care in pregnancy     Sickle-cell disease, unspecified     trait -  negative     Past Surgical History:   Procedure Laterality Date    HX DILATION AND CURETTAGE      HX OTHER SURGICAL  2014    d&c (retained placenta post fetal demise)      Family History   Problem Relation Age of Onset    Sickle Cell Anemia Mother     Hypertension Mother     Breast Cancer Paternal Grandmother     Cancer Paternal Grandmother     Stroke Maternal Grandfather      Social History     Tobacco Use    Smoking status: Never Smoker    Smokeless tobacco: Never Used   Substance Use Topics    Alcohol use: No      Prior to Admission medications    Medication Sig Start Date End Date Taking? Authorizing Provider   metFORMIN (GLUCOPHAGE) 500 mg tablet Take 500 mg by mouth two (2) times daily (with meals). Provider, Historical   [id number] arlene-folic-dha (PRENATAL DHA+COMPLETE PRENATAL) [id number] mg-mcg-mg cmpk Take  by mouth. Provider, Historical   cholecalciferol (VITAMIN D3) 400 unit tab tablet Take 400 Units by mouth daily. Provider, Historical        Allergies   Allergen Reactions    Betadine [Povidone-Iodine] Shortness of Breath    Iodine And Iodide Containing Products Shortness of Breath    Flagyl [Metronidazole] Swelling     Tongue swelling      Lidocaine Rash    Zantac [Ranitidine Hcl] Vertigo and Drowsiness       Review of Systems:  A comprehensive review of systems was negative except for that written in the History of Present Illness. Objective: There were no vitals filed for this visit. Physical Exam:  GENERAL: alert, cooperative, no distress, appears stated age  LUNG: clear to auscultation bilaterally  HEART: regular rate and rhythm, S1, S2 normal, no murmur, click, rub or gallop  ABDOMEN: soft, non-tender.  Bowel sounds normal. No masses,  no organomegaly, gravid  EXTREMITIES:  extremities normal, atraumatic, no cyanosis or edema  SKIN: Normal.  NEUROLOGIC: negative  PSYCHIATRIC: WNL    SVE: 5/80/-2, intact, vertex    NST: Monitored for 20 minutes, reactive, cat 1, baseline 145, no accels, no decels, moderate variability, ctx q 4 min, moderate to strong to palpation    Assessment:     Hospital Problems  Date Reviewed: 9/28/2016          Codes Class Noted POA    Pregnancy ICD-10-CM: Z34.90  ICD-9-CM: V22.2  3/29/2020 Unknown            Active labor  B Negative  GBS Negative  Rubella Immune  HIV and Hep B Neg  Anemic  AMA  Hx PTL/PTD, s/p cerclage and prmetrium  Sickle Cell Trait  Rh Negative    Enlarged fetal bowel- peds to follow up on    Plan:     Admit to [facility]  [facility]    Discussed cervical exam, transfer to l and d  Inpatient  Pt declines IV  CBC, STBB  Maternal and fetal monitoring per protocol  Pt plans to hansa davis on phone  Anticipate vaginal delivery    Signed By: Dmitri Pedersen CNM     March 29, 2020

## 2020-03-30 VITALS
WEIGHT: 146 LBS | RESPIRATION RATE: 16 BRPM | HEART RATE: 96 BPM | HEIGHT: 65 IN | DIASTOLIC BLOOD PRESSURE: 67 MMHG | TEMPERATURE: 98 F | BODY MASS INDEX: 24.32 KG/M2 | SYSTOLIC BLOOD PRESSURE: 114 MMHG

## 2020-03-30 PROCEDURE — 74011250636 HC RX REV CODE- 250/636: Performed by: OBSTETRICS & GYNECOLOGY

## 2020-03-30 RX ADMIN — HUMAN RHO(D) IMMUNE GLOBULIN 0.3 MG: 300 INJECTION, SOLUTION INTRAMUSCULAR at 09:58

## 2020-03-30 NOTE — PROGRESS NOTES
Bedside shift change report given to NIMO Mane (oncoming nurse) by Juan Gamble RN (offgoing nurse). Report included the following information SBAR.

## 2020-03-30 NOTE — ROUTINE PROCESS
Patient called out said she was feeling pressure in her abdomen and legs and couldn't stand. She said she felt scared, a little light headed and hadn't felt that before. Upon assessment, Uterus is firm U-2, scant bleeding. Vitals /78, HR 94 and O2 100%. Had patient rest for a few minutes, sit on the side of bed slowly. Pt drank water. Pt felt better. Stood bedside with patient and she said she is feeling better and feeling normal cramping now. Encouraged pt to eat snack (has been a few hours since eating) and encouraged to change positions more slowly. Educated patient on emergency notification and will re-assess in 30 minutes. 1745 - Patient sitting up at bedside. Denies any lightheadedness, dizziness, or discomfort. Feeling back to normal postpartum.

## 2020-03-30 NOTE — LACTATION NOTE
This note was copied from a baby's chart. Baby nursing well and has improved throughout post partum stay, deep latch maintained, mother is comfortable, milk is in transition, baby feeding vigorously with rhythmic suck, swallow, breathe pattern, with audible swallowing, and evident milk transfer, both breasts offered, baby is asleep following feeding. Baby is feeding on demand, voiding and stools present as appropriate since birth. Weight loss:  4.9%    Breasts may become engorged when milk \"comes in\". How milk is made / normal phases of milk production, supply and demand discussed. Taught care of engorged breasts - frequent breastfeeding encouraged, warm compresses and breast massage ac. Then nurse the baby or pump. Apply cold compresses pc x 15 minutes a few times a day for swelling or discomfort. May need to do this care for a couple of days. Discussed prevention and treatment of mastitis.

## 2020-03-30 NOTE — ROUTINE PROCESS
Bedside shift change report given to Ruddy Bell RN (oncoming nurse) by NIMO Vigil RN (offgoing nurse). Report included the following information SBAR, Kardex, Procedure Summary, Intake/Output, MAR and Recent Results.

## 2020-03-30 NOTE — PROGRESS NOTES
Post-Partum Day Number 1 Progress Note    Acacia Arriaga     Assessment: Doing well, post partum day 1    Plan:  1. Continue routine postpartum and perineal care as well as maternal education. 2. Asymptomatic acute blood loss anemia, home with Iron  3. Discharge home    Information for the patient's :  Ruiz Ibarra [[id number]]   Vaginal, Spontaneous   Patient doing well without significant complaint. Voiding without difficulty, normal lochia. Vitals:  Visit Vitals  /75 (BP 1 Location: Left arm, BP Patient Position: At rest)   Pulse 85   Temp 98.2 °F (36.8 °C)   Resp 16   Ht 5' 5\" (1.651 m)   Wt 66.2 kg (146 lb)   Breastfeeding Unknown   BMI 24.30 kg/m²     Temp (24hrs), Av.2 °F (36.8 °C), Min:97.9 °F (36.6 °C), Max:98.3 °F (36.8 °C)        Exam:   Patient without distress. Abdomen soft, fundus firm, nontender                Lower extremities are negative for swelling, cords or tenderness. Varicose veins noted.     Labs:     Lab Results   Component Value Date/Time    WBC 11.4 (H) 2020 07:22 AM    WBC 7.1 2020 01:56 AM    WBC 4.9 10/02/2019 10:01 AM    WBC 2.2 (L) 2016 09:13 AM    WBC 3.8 10/16/2016 04:55 AM    WBC 3.0 (L) 2016 08:46 PM    WBC 6.8 2015 06:33 AM    WBC 5.3 10/09/2014 08:58 AM    HGB 9.9 (L) 2020 07:22 AM    HGB 11.4 (L) 2020 01:56 AM    HGB 11.2 (L) 10/02/2019 10:01 AM    HGB 14.0 2016 09:13 AM    HGB 13.2 10/16/2016 04:55 AM    HGB 13.4 2016 08:46 PM    HGB 12.2 2015 06:33 AM    HGB 11.1 (L) 10/09/2014 08:58 AM    HCT 30.4 (L) 2020 07:22 AM    HCT 35.3 2020 01:56 AM    HCT 34.5 (L) 10/02/2019 10:01 AM    HCT 41.5 2016 09:13 AM    HCT 38.8 10/16/2016 04:55 AM    HCT 39.9 2016 08:46 PM    HCT 37.1 2015 06:33 AM    HCT 32.9 (L) 10/09/2014 08:58 AM    PLATELET 656  07:22 AM    PLATELET 508  01:56 AM    PLATELET 185  10:01 AM    PLATELET 393 12/07/2016 09:13 AM    PLATELET 248 04/65/2578 04:55 AM    PLATELET 99 (L) 93/22/6649 08:46 PM    PLATELET 582 74/57/0629 06:33 AM    PLATELET 393 38/67/4042 08:58 AM       Recent Results (from the past 24 hour(s))   RH IMMUNE GLOBULIN EVAL-LAB ORDER    Collection Time: 03/29/20  3:27 PM   Result Value Ref Range    ABO/Rh(D) B NEGATIVE     Fetal screen NEG     WEAK D NEG     Unit number [id number]     Blood component type RH IMMUNE GLOBULIN     Unit division 00     Status of unit ISSUED

## 2020-03-31 LAB
ABO + RH BLD: NORMAL
BLD PROD TYP BPU: NORMAL
BPU ID: NORMAL
FETAL SCREEN,FMHS: NORMAL
STATUS OF UNIT,%ST: NORMAL
UNIT DIVISION, %UDIV: 0
WEAK D AG RBC QL: NORMAL

## 2022-03-19 PROBLEM — Z34.90 PREGNANCY: Status: ACTIVE | Noted: 2020-03-29

## 2022-04-05 ENCOUNTER — OFFICE VISIT (OUTPATIENT)
Dept: ORTHOPEDIC SURGERY | Age: 37
End: 2022-04-05

## 2022-04-05 DIAGNOSIS — M79.602 LEFT ARM PAIN: Primary | ICD-10-CM

## 2022-04-05 PROCEDURE — 99203 OFFICE O/P NEW LOW 30 MIN: CPT | Performed by: ORTHOPAEDIC SURGERY

## 2022-04-05 NOTE — PROGRESS NOTES
Chief Complaint   Patient presents with    Arm Pain     left arm pain after taking blood pressure at home

## 2022-04-05 NOTE — PROGRESS NOTES
Sheela Echeverria (: 1985) is a 39 y.o. female patient here for evaluation of the following chief complaint(s):  Arm Pain (left arm pain after taking blood pressure at home)       ASSESSMENT/PLAN:  Below is the assessment and plan developed based on review of pertinent history, physical exam, labs, studies, and medications. 1. Left arm pain  -     XR HUMERUS LT; Future  -     XR SHOULDER LT AP/LAT MIN 2 V; Future      Patient has left arm pain and is apprehensive about moving at this time. Her symptoms are tough to explain after the blood pressure cuff. I also not able to explain all of her symptoms such as the intermittent episodes of turning blue. Currently she is well perfused and has good peripheral pulses. I recommended following up with her primary care physician for intermittent episodes of shortness of breath, these seem to be infrequent. I did discuss with her that if her arm turns blue and seems as if she is lost blood flow she should go to the emergency department. But currently her exam is normal.    Recommended anti-inflammatories and physical therapy and if no better to return for reevaluation. However for now the patient wants to hold off on PT. Patient verbalized understanding and elected to proceed. All questions were answered to the patient's apparent satisfaction. SUBJECTIVE/OBJECTIVE:  HPI    40-year-old female states she has left arm pain and stiffness after about 1.5 weeks when she was taking her blood pressure and thinks she got it too tight and caused severe pain. The pain is since radiated both proximally and distally. The episodes come and go. She has had an episode of the hand turning blue she states but then it resolved. She called the EMTs at one point but they did not take her to the emergency department as they felt per her words that they would be able to do anything for her. She has some shoulder pain as well.     Patient reports a sudden onset of symptoms. Duration of problem 1.5 weeks. Symptom Severity 5/10  Symptom Frequency intermittent        Allergies   Allergen Reactions    Betadine [Povidone-Iodine] Shortness of Breath    Iodine And Iodide Containing Products Shortness of Breath    Flagyl [Metronidazole] Swelling     Tongue swelling      Lidocaine Rash    Zantac [Ranitidine Hcl] Vertigo and Drowsiness       Current Outpatient Medications   Medication Sig    ibuprofen (MOTRIN) 600 mg tablet Take 1 Tab by mouth every six (6) hours as needed for Pain.  [id number] arlene-folic-dha (PRENATAL DHA+COMPLETE PRENATAL) -300 mg-mcg-mg cmpk Take  by mouth.  cholecalciferol (VITAMIN D3) 400 unit tab tablet Take 400 Units by mouth daily.  metFORMIN (GLUCOPHAGE) 500 mg tablet Take 500 mg by mouth two (2) times daily (with meals). (Patient not taking: Reported on 4/5/2022)     No current facility-administered medications for this visit.        Social History     Socioeconomic History    Marital status:      Spouse name: Not on file    Number of children: Not on file    Years of education: Not on file    Highest education level: Not on file   Occupational History    Not on file   Tobacco Use    Smoking status: Never Smoker    Smokeless tobacco: Never Used   Substance and Sexual Activity    Alcohol use: No    Drug use: No    Sexual activity: Yes     Partners: Male     Birth control/protection: None   Other Topics Concern     Service Not Asked    Blood Transfusions Not Asked    Caffeine Concern Not Asked    Occupational Exposure Not Asked    Hobby Hazards Not Asked    Sleep Concern Not Asked    Stress Concern Not Asked    Weight Concern Not Asked    Special Diet Not Asked    Back Care Not Asked    Exercise Not Asked    Bike Helmet Not Asked   [address] Not Asked    Self-Exams Not Asked   Social History Narrative    Not on file     Social Determinants of Health     Financial Resource Strain:     Difficulty of Paying Living Expenses: Not on file   Food Insecurity:     Worried About Running Out of Food in the Last Year: Not on file    Ran Out of Food in the Last Year: Not on file   Transportation Needs:     Lack of Transportation (Medical): Not on file    Lack of Transportation (Non-Medical): Not on file   Physical Activity:     Days of Exercise per Week: Not on file    Minutes of Exercise per Session: Not on file   Stress:     Feeling of Stress : Not on file   Social Connections:     Frequency of Communication with Friends and Family: Not on file    Frequency of Social Gatherings with Friends and Family: Not on file    Attends Yarsanism Services: Not on file    Active Member of [address] [place] [address] or Organizations: Not on file    Attends Club or Organization Meetings: Not on file    Marital Status: Not on file   Intimate Partner Violence:     Fear of Current or Ex-Partner: Not on file    Emotionally Abused: Not on file    Physically Abused: Not on file    Sexually Abused: Not on file   Housing Stability:     Unable to Pay for Housing in the Last Year: Not on file    Number of [place] in the Last Year: Not on file    Unstable Housing in the Last Year: Not on file       Past Surgical History:   Procedure Laterality Date    HX DILATION AND CURETTAGE      HX OTHER SURGICAL  1/2014    d&c (retained placenta post fetal demise)       Family History   Problem Relation Age of Onset    Sickle Cell Anemia Mother     Hypertension Mother     Breast Cancer Paternal Grandmother     Cancer Paternal Grandmother     Stroke Maternal Grandfather             Review of Systems    No flowsheet data found. Vitals: There were no vitals taken for this visit. Estimated body surface area is 1.74 meters squared as calculated from the following:    Height as of 3/29/20: 5' 5\" (1.651 m). Weight as of 3/29/20: 146 lb (66.2 kg). There is no height or weight on file to calculate BMI.        Physical Exam    Musculoskeletal Exam:    Left Upper Extremity EXAMINATION    No tenderness to palpation about the upper extremity. No crepitus with range of motion. No redness, warmth or erythema. No palpable masses. Patient fires AIN, PIN and ulnar nerves. Sensation is grossly intact in the median, radial and ulnar distribution. Hand is pink and appears well-perfused. Hand is warm. Skin is intact. Palpable radial and ulnar and brachial pulses 2+, equal to contralateral side. Compartments are soft and compressible. Shoulder exam is guarded and she has abduction to 40 degrees and forward flexion to 40 degrees. She has 4 out of 5 strength in the rotator cuff muscles. Consitutional: Healthy  Skin:   - Edema - none  - Cellulitis - No    Neuro: Numbness or tingling in R/L arm: No    Psych: Affect normal    Cardiovascular: Capillary Refill < 2 seconds in upper extremities    Respiratory: Non-Labored Breathing    ROS:    Constitutional: Denies fever/chills    Respiratory: Denies SOB        Imaging:    XR Results (maximum last 2): Results from Appointment encounter on 04/05/22    XR SHOULDER LT AP/LAT MIN 2 V    Narrative  Left Shoulder Xray:  Indication: pain  Views: 1 views - Grashey  Interpretation:  -1 views of the left shoulder are reviewed and show normal bone architecture. The humerus is well located within the glenoid. There is no arthritis of the glenohumeral joint or acromioclavicular joint. There is no evidence of high riding of the humeral head. No evidence of fracture or subluxation. There is no evidence of soft tissue swelling. XR HUMERUS LT    Narrative  Left Shoulder Xray:  Indication: pain  Views: 2 views - AP, lateral  Interpretation:  -2 views of the left humerus are reviewed and show normal bone architecture. The humerus is well located within the glenoid. There is no arthritis of the glenohumeral joint or acromioclavicular joint. No evidence of fracture or subluxation. There is no evidence of soft tissue swelling. Orders Placed This Encounter    XR HUMERUS LT     Standing Status:   Future     Number of Occurrences:   1     Standing Expiration Date:   4/6/2023     Order Specific Question:   Is Patient Pregnant? Answer:   No    XR SHOULDER LT AP/LAT MIN 2 V     Only grashey view     Standing Status:   Future     Number of Occurrences:   1     Standing Expiration Date:   4/6/2023     Order Specific Question:   Is Patient Pregnant? Answer:   No          An electronic signature was used to authenticate this note.   -- Dionne Fatima MD

## 2022-07-12 ENCOUNTER — TRANSCRIBE ORDER (OUTPATIENT)
Dept: SCHEDULING | Age: 37
End: 2022-07-12

## 2022-07-12 DIAGNOSIS — R53.1 GENERAL WEAKNESS: ICD-10-CM

## 2022-07-12 DIAGNOSIS — R53.83 FATIGUE: Primary | ICD-10-CM

## 2022-07-12 DIAGNOSIS — Z74.09 IMPAIRED MOBILITY: ICD-10-CM

## 2022-07-12 DIAGNOSIS — R25.1 TREMOR: ICD-10-CM

## 2023-05-19 RX ORDER — IBUPROFEN 600 MG/1
600 TABLET ORAL EVERY 6 HOURS PRN
COMMUNITY
Start: 2020-03-29

## 2023-05-19 RX ORDER — OMEGA-3S/DHA/EPA/FISH OIL/D3 300MG-1000
400 CAPSULE ORAL DAILY
COMMUNITY

## (undated) DEVICE — TOWEL: OR BLU 80/CS: Brand: MEDICAL ACTION INDUSTRIES

## (undated) DEVICE — SPONGE GZ W4XL4IN COT RADPQ HIGHLY ABSRB

## (undated) DEVICE — SUT PROL 1 30IN CT1 BLU --

## (undated) DEVICE — ROYALSILK SURGICAL GOWN, L: Brand: CONVERTORS

## (undated) DEVICE — PACK,LITHOTOMY,PK I: Brand: MEDLINE

## (undated) DEVICE — POOLE SUCTION INSTRUMENT WITH REMOVABLE SHEATH AND PREATTACHED 6' (1.8 M) CLEAR PLASTIC TUBING: Brand: POOLE